# Patient Record
(demographics unavailable — no encounter records)

---

## 2024-10-08 NOTE — PHYSICAL EXAM
[NL] : warm, clear [Clear] : right tympanic membrane not clear [Erythema] : no erythema [Bulging] : not bulging [Enlarged Tonsils] : tonsils not enlarged [Vesicles] : no vesicles [Exudate] : no exudate [Ulcerative Lesions] : no ulcerative lesions [Palate petechiae] : palate without petechiae [Wheezing] : no wheezing [Rales] : no rales [Tachypnea] : no tachypnea [Rhonchi] : no rhonchi [FreeTextEntry4] : nasal congestion

## 2024-10-08 NOTE — DISCUSSION/SUMMARY
[FreeTextEntry1] : Anticipatory guidance and parent education given Discussed with parent diagnosis of right otitis media. Complete antibiotic course. Potential side effect of antibiotics includes but not limited to diarrhea. Give probiotics Provide Tylenol or Motrin as needed for pain or fever, increased fluids, cool mist humidifier, nasal saline with nasal suction, warm baths Return in 2 weeks for ear recheck

## 2024-10-08 NOTE — REVIEW OF SYSTEMS
[Fever] : fever [Fussy] : fussy [Nasal Congestion] : nasal congestion [Cough] : cough [Congestion] : congestion [Negative] : Genitourinary [Ear Tugging] : no ear tugging [Nasal Discharge] : no nasal discharge [Tachypnea] : not tachypneic [Wheezing] : no wheezing

## 2024-10-08 NOTE — HISTORY OF PRESENT ILLNESS
[de-identified] : 15month old m c/o cough congestion fever  [FreeTextEntry6] : Cough and congestion x 1 week, cough getting worse, increased nasal congestion but no rhinorrhea Had fever x 2 days 1 week ago with vomiting, No fever again until last night, temp up to 100.9, Mother gave Tylenol Decreased appetite, drinking and urinating well No SOB, difficulty breathing, tachypnea Woke up during night last night Some fussiness, not as playful

## 2024-10-22 NOTE — HISTORY OF PRESENT ILLNESS
[de-identified] : recheck ears, pt dx with ear infection 2 weeks ago, doing better, no c/o as per mom.  [FreeTextEntry6] : Follow up to recheck ears Seen 10/8 with URI, diagnosed with right OM Completed 10 day course of Amoxicillin Mother reports doing well, all symptoms have resolved

## 2025-01-14 NOTE — DISCUSSION/SUMMARY
[FreeTextEntry1] : hemoccult x3 separate days discussed how to collect, write date if has dark black tarry stools discussed d/c dairy with diarrhea mom would d like to restart Similac 360 total comfort and discussed start Pedialyte Parent/guardian aware rapid Covid 19 test negative rapid flu test negative Reviewed giving adequate fluids including Pedialyte  mild blood after test for rapid Covid and rapid flu test small amounts stopped bleeding both nares discussed how to apply pressure to nose if increased bleeding no weight loss has tears Discontinue dairy until symptoms have resolved.give probiotics such as Culturelle pediatric     Patient or caretaker was instructed in use of antipyretics. If signs of dehydration, lethargy, blood in stool, condition worsens  go to ER.   Symptomatic treatment. Handwashing and infection control  Follow up in 3 days if symptoms persist and consider GI PCR recent foreign travel

## 2025-01-14 NOTE — HISTORY OF PRESENT ILLNESS
[de-identified] : dark colored-black liquid stool, having bm more frequent, decreased appetite, fever since yesterday, tylenol at 4 pm [FreeTextEntry6] : DEVENDRA  is here today for a history of diarrhea , dark bm, fever decreased appetite dark stool mom showed me yesterday one time dark brown not tarry  6 times today diarrhea abdominal pian crying having cramps then diarrhea no red blood travel [facility] 2.5 weeks ago no illness symptoms until a day ago, no ill contacts at home fever 101.6 one day vomit x1 with milk and otc med active feeding ok milk  and water  urinating  having at least 3 wet diapers no  has upcoming wcc later this month

## 2025-01-14 NOTE — PHYSICAL EXAM
[TextEntry] : General Appearance:  Awake,  Alert  In no acute distress well appearing active moist mucus membranes cap refill less than 2 seconds Head:  Appearance:  Anterior fontanelle open and flat sclera non icteric Neck:  supple. Ears: bilateral  Tympanic Membrane:  Pearly with light reflex bilaterally. Pharynx:Normal findings  non erythematous pharynx Lungs:  Clear to auscultation. Cardiovascular: Heart Rate And Rhythm:  Regular. Heart Sounds:  Normal. Murmurs:  No murmurs were heard. Abdomen: Palpation:  Soft.  Liver:  Not enlarged. Spleen:  Not enlarged. good bowel sounds Genitalia: Alfredo 1 testes descended bilateral , no hernia .

## 2025-01-17 NOTE — DISCUSSION/SUMMARY
[FreeTextEntry1] : weight loss one pound push fluids including Pedialyte ice pops, will increase formula milk based  hemoccult done in office 4  first 2 very dark positive, 3rd one less, and 4th one diarrhea in office brown no areli visible blood less positive than first 2 done continue no wipes, using desitin and aquaphor mixture can add Mylanta mupirocin ointment tid for 7 to 10 days then top with above barrier mixture call back to check on [place] if signs of dehydration, decreased urine output, lethargy , increasing blood in diapers worsening symptoms go to er rx for GI PCR stool given, parent to ask how long it will take for results and will call office discussed will hold rvp pcr , history of rapid covid 19 and flu test negative 2 days ago

## 2025-01-17 NOTE — HISTORY OF PRESENT ILLNESS
[de-identified] : As per Parent, Pt c/o DIARRHEA AND FEVER x FEW days. BROUGHT STOOL CARDS. [FreeTextEntry6] : DEVENDRA  is here today for follow up diarrhea -with red blood, fever, decreased appetite evaluated in our office 1/14, for fever and diarrhea, fever started 1/13   diarrhea  yesterday seeing visible red blood less blood  today fever 101.6 every 6 to 8h acetaminophen  diaper region, red raw irritated taking probiotic, urinating, periods of crying cramps , having wet diapers decreased appetite, taking small amounts of Pedialyte with syringe, water , one bottle formula no vomiting parents no illness symptoms 6 times today, 7 to 8 times last night blood now less blood history of travel to [facility] about 2.5 weeks ago

## 2025-01-17 NOTE — PHYSICAL EXAM
[TextEntry] : General Appearance:  Awake,  Alert  In no acute distress uncooperative, tears cap refill less than 2 seconds, moist mucus membranes sclera non icteric Ears: bilateral  Tympanic Membrane:  Pearly with light reflex bilaterally. Pharynx:Normal findings  non erythematous pharynx Lungs:  Clear to auscultation. Cardiovascular: Heart Rate And Rhythm:  Regular. Heart Sounds:  Normal. Murmurs:  No murmurs were heard. Abdomen: Palpation:  Soft.  Liver:  Not enlarged. Spleen:  Not enlarged.soft non distended, good bowel sounds  Genitalia: Alfredo 1 testes descended bilateral , no hernia diaper rash red irritated open right more than left no pustules

## 2025-01-17 NOTE — DISCUSSION/SUMMARY
[FreeTextEntry1] : weight loss one pound push fluids including Pedialyte ice pops, will increase formula milk based  hemoccult done in office 4  first 2 very dark positive, 3rd one less, and 4th one diarrhea in office brown no areli visible blood less positive than first 2 done continue no wipes, using desitin and aquaphor mixture can add Mylanta mupirocin ointment tid for 7 to 10 days then top with above barrier mixture call back to check on [place] if signs of dehydration, decreased urine output, lethargy , increasing blood in diapers worsening symptoms go to er rx for GI PCR stool given, parent to ask how long it will take for results and will call office discussed will hold rvp pcr , history of rapid covid 19 and flu test negative 2 days ago   Him/He

## 2025-01-17 NOTE — HISTORY OF PRESENT ILLNESS
[de-identified] : As per Parent, Pt c/o DIARRHEA AND FEVER x FEW days. BROUGHT STOOL CARDS. [FreeTextEntry6] : DEVENDRA  is here today for follow up diarrhea -with red blood, fever, decreased appetite evaluated in our office 1/14, for fever and diarrhea, fever started 1/13   diarrhea  yesterday seeing visible red blood less blood  today fever 101.6 every 6 to 8h acetaminophen  diaper region, red raw irritated taking probiotic, urinating, periods of crying cramps , having wet diapers decreased appetite, taking small amounts of Pedialyte with syringe, water , one bottle formula no vomiting parents no illness symptoms 6 times today, 7 to 8 times last night blood now less blood history of travel to Counts include 234 beds at the [facility] about 2.5 weeks ago

## 2025-01-28 NOTE — DISCUSSION/SUMMARY
[] : The components of the vaccine(s) to be administered today are listed in the plan of care. The disease(s) for which the vaccine(s) are intended to prevent and the risks have been discussed with the caretaker.  The risks are also included in the appropriate vaccination information statements which have been provided to the patient's caregiver.  The caregiver has given consent to vaccinate. [FreeTextEntry1] : Continue whole cow's milk. Continue table foods, 3 meals with 2-3 snacks per day. MVI with fluoride daily if not taking fluorinated water. Brush teeth twice a day with soft toothbrush. Recommend visit to dentist. When in car, keep child in rear-facing car seats until age 2, or until  the maximum height and weight for seat is reached. Put toddler to sleep in own bed or crib. Help toddler to maintain consistent daily routines and sleep schedule. Toilet training discussed. Recognize anxiety in new settings. Ensure home is safe. Be within arm's reach of toddler at all times. Use consistent, positive discipline. Read aloud to toddler. F/u in 6months at 2yr [facility]. Parent c/o speech- reviewed within expected for age based on parent report- early intervention information given for evaluation.  Salmonella gastroenteritis- resolved diarrhea, afebrile, ok for vaccines today, continue to monitor, keep hydrated.

## 2025-01-28 NOTE — DEVELOPMENTAL MILESTONES
[Normal Development] : Normal Development [Yes: _______] : yes, [unfilled] [FreeTextEntry1] : no vision or hearing concerns; parents are concerned about pt speech development.  ALFONSO and DORY reviewed- per mom pt speaking 6-10words, running/walking well, using fork, feeds himself- throws ball

## 2025-01-28 NOTE — PHYSICAL EXAM
[Alert] : alert [No Acute Distress] : no acute distress [Normocephalic] : normocephalic [Anterior [place] Closed] : anterior fontanelle closed [Red Reflex Bilateral] : red reflex bilateral [PERRL] : PERRL [Normally Placed Ears] : normally placed ears [Auricles Well Formed] : auricles well formed [Clear Tympanic membranes with present light reflex and bony landmarks] : clear tympanic membranes with present light reflex and bony landmarks [No Discharge] : no discharge [Nares Patent] : nares patent [Palate Intact] : palate intact [Uvula Midline] : uvula midline [Tooth Eruption] : tooth eruption  [Supple, full passive range of motion] : supple, full passive range of motion [No Palpable Masses] : no palpable masses [Symmetric Chest Rise] : symmetric chest rise [Clear to Auscultation Bilaterally] : clear to auscultation bilaterally [Regular Rate and Rhythm] : regular rate and rhythm [S1, S2 present] : S1, S2 present [No Murmurs] : no murmurs [+2 Femoral Pulses] : +2 femoral pulses [Soft] : soft [NonTender] : non tender [Non Distended] : non distended [Normoactive Bowel Sounds] : normoactive bowel sounds [No Hepatomegaly] : no hepatomegaly [No Splenomegaly] : no splenomegaly [Central Urethral Opening] : central urethral opening [Testicles Descended Bilaterally] : testicles descended bilaterally [Patent] : patent [Normally Placed] : normally placed [No Abnormal Lymph Nodes Palpated] : no abnormal lymph nodes palpated [No Clavicular Crepitus] : no clavicular crepitus [Symmetric Buttocks Creases] : symmetric buttocks creases [No Spinal Dimple] : no spinal dimple [NoTuft of Hair] : no tuft of hair [Cranial Nerves Grossly Intact] : cranial nerves grossly intact [No Rash or Lesions] : no rash or lesions

## 2025-01-28 NOTE — HISTORY OF PRESENT ILLNESS
[Parents] : parents [Fruit] : fruit [Vegetables] : vegetables [Meat] : meat [Cereal] : cereal [Normal] : Normal [Brushing teeth] : Brushing teeth [Yes] : Patient goes to dentist yearly [Vitamin] : Primary Fluoride Source: Vitamin [No] : No cigarette smoke exposure [Water heater temperature set at <120 degrees F] : Water heater temperature set at <120 degrees F [Car seat in back seat] : Car seat in back seat [Carbon Monoxide Detectors] : Carbon monoxide detectors [Smoke Detectors] : Smoke detectors [FreeTextEntry7] : 18month old m here for a physical [FreeTextEntry1] : Pt was seen on 1/16/25 due to bloody diarrhea- Gi PCR positive for salmonella but stool cx negative, per parents diarrhea has resolved, pt eating well, normal voiding, no fevers. stooling daily- no blood in stool, soft but not loose, no n/v. Pt had traveled to [facility] prior to onset of diarrhea- parents state infection source was likely contacted during travel.  pt has regained weight lost during illness; eating well per parents.  followed by ophthalmology- to f/u 6/2025

## 2025-03-14 NOTE — PHYSICAL EXAM
[TextEntry] : General: alert and active in no apparent distress Eyes: conjunctiva clear, EOMI Ears: TMs translucent bilaterally, normal landmarks noted, normal canals Nose: no rhinorrhea OP: no tonsillar enlargement/exudate, no lesions, no posterior pharyngeal erythema Neck: supple, no adenopathy Lungs: clear to auscultation bilaterally, good air exchange, no retractions, comfortable WOB CVS: Normal rate, regular rhythm, no murmur Abdomen: soft, nondistended, nontender, and no hepatosplenomegaly or masses, normoactive bowel sounds Skin: No rashes, lesions or skin changes

## 2025-03-14 NOTE — HISTORY OF PRESENT ILLNESS
[de-identified] : Dad reports pt threw up last night and was running lowgrade T. Decreased appetite and normal voiding reported. Last Tylenol given around 1PM per dad. Denies diarrhea. [FreeTextEntry6] : In addition to above: tactile fever this AM NBNB emesis x 2 since last night, no diarrhea Sneezing a little but no cough or congestion No recent travel No s/c at home Making good w/d

## 2025-03-14 NOTE — PLAN
[TextEntry] : - Avoid medication unless ordered - Discussed oral rehydration - Discussed concerning symptoms requiring emergent evaluation - Follow up as needed

## 2025-07-14 NOTE — HISTORY OF PRESENT ILLNESS
[de-identified] : fever red spots on feet butt and hands  [FreeTextEntry6] : fever last night - 100.8 red spots on hands and feet today no cough, no congestion no vomiting, no diarrhea decreased appetite

## 2025-07-14 NOTE — PLAN
[TextEntry] : symptomatic treatment fluids intake handwashing and infection control discussed recheck if worse/not improving

## 2025-07-14 NOTE — PHYSICAL EXAM
[NL] : warm, clear [de-identified] : erythematous oropharynx with blisters on posterior palate [de-identified] : erythematous macular and papular lesions on hands, feet, buttocks